# Patient Record
Sex: FEMALE | Race: WHITE | URBAN - METROPOLITAN AREA
[De-identification: names, ages, dates, MRNs, and addresses within clinical notes are randomized per-mention and may not be internally consistent; named-entity substitution may affect disease eponyms.]

---

## 2022-07-07 ENCOUNTER — INPATIENT (INPATIENT)
Facility: HOSPITAL | Age: 82
LOS: 3 days | Discharge: HOME | End: 2022-07-11
Attending: HOSPITALIST | Admitting: HOSPITALIST

## 2022-07-07 VITALS — WEIGHT: 119.93 LBS

## 2022-07-07 LAB
ALBUMIN SERPL ELPH-MCNC: 3.6 G/DL — SIGNIFICANT CHANGE UP (ref 3.5–5.2)
ALP SERPL-CCNC: 115 U/L — SIGNIFICANT CHANGE UP (ref 30–115)
ALT FLD-CCNC: 8 U/L — SIGNIFICANT CHANGE UP (ref 0–41)
ANION GAP SERPL CALC-SCNC: 11 MMOL/L — SIGNIFICANT CHANGE UP (ref 7–14)
APTT BLD: 28.7 SEC — SIGNIFICANT CHANGE UP (ref 27–39.2)
AST SERPL-CCNC: 15 U/L — SIGNIFICANT CHANGE UP (ref 0–41)
BASOPHILS # BLD AUTO: 0.04 K/UL — SIGNIFICANT CHANGE UP (ref 0–0.2)
BASOPHILS # BLD AUTO: 0.04 K/UL — SIGNIFICANT CHANGE UP (ref 0–0.2)
BASOPHILS NFR BLD AUTO: 0.6 % — SIGNIFICANT CHANGE UP (ref 0–1)
BASOPHILS NFR BLD AUTO: 0.6 % — SIGNIFICANT CHANGE UP (ref 0–1)
BILIRUB SERPL-MCNC: 0.4 MG/DL — SIGNIFICANT CHANGE UP (ref 0.2–1.2)
BLD GP AB SCN SERPL QL: SIGNIFICANT CHANGE UP
BUN SERPL-MCNC: 16 MG/DL — SIGNIFICANT CHANGE UP (ref 10–20)
CALCIUM SERPL-MCNC: 8.7 MG/DL — SIGNIFICANT CHANGE UP (ref 8.5–10.1)
CHLORIDE SERPL-SCNC: 107 MMOL/L — SIGNIFICANT CHANGE UP (ref 98–110)
CO2 SERPL-SCNC: 26 MMOL/L — SIGNIFICANT CHANGE UP (ref 17–32)
CREAT SERPL-MCNC: 0.7 MG/DL — SIGNIFICANT CHANGE UP (ref 0.7–1.5)
EGFR: 86 ML/MIN/1.73M2 — SIGNIFICANT CHANGE UP
EOSINOPHIL # BLD AUTO: 0.13 K/UL — SIGNIFICANT CHANGE UP (ref 0–0.7)
EOSINOPHIL # BLD AUTO: 0.17 K/UL — SIGNIFICANT CHANGE UP (ref 0–0.7)
EOSINOPHIL NFR BLD AUTO: 2 % — SIGNIFICANT CHANGE UP (ref 0–8)
EOSINOPHIL NFR BLD AUTO: 2.5 % — SIGNIFICANT CHANGE UP (ref 0–8)
GLUCOSE SERPL-MCNC: 112 MG/DL — HIGH (ref 70–99)
HCT VFR BLD CALC: 25.9 % — LOW (ref 37–47)
HCT VFR BLD CALC: 28.3 % — LOW (ref 37–47)
HGB BLD-MCNC: 7.5 G/DL — LOW (ref 12–16)
HGB BLD-MCNC: 8.2 G/DL — LOW (ref 12–16)
IMM GRANULOCYTES NFR BLD AUTO: 0.1 % — SIGNIFICANT CHANGE UP (ref 0.1–0.3)
IMM GRANULOCYTES NFR BLD AUTO: 0.3 % — SIGNIFICANT CHANGE UP (ref 0.1–0.3)
INR BLD: 1.08 RATIO — SIGNIFICANT CHANGE UP (ref 0.65–1.3)
LYMPHOCYTES # BLD AUTO: 1.6 K/UL — SIGNIFICANT CHANGE UP (ref 1.2–3.4)
LYMPHOCYTES # BLD AUTO: 1.98 K/UL — SIGNIFICANT CHANGE UP (ref 1.2–3.4)
LYMPHOCYTES # BLD AUTO: 24.7 % — SIGNIFICANT CHANGE UP (ref 20.5–51.1)
LYMPHOCYTES # BLD AUTO: 29.6 % — SIGNIFICANT CHANGE UP (ref 20.5–51.1)
MCHC RBC-ENTMCNC: 22.8 PG — LOW (ref 27–31)
MCHC RBC-ENTMCNC: 23.1 PG — LOW (ref 27–31)
MCHC RBC-ENTMCNC: 29 G/DL — LOW (ref 32–37)
MCHC RBC-ENTMCNC: 29 G/DL — LOW (ref 32–37)
MCV RBC AUTO: 78.7 FL — LOW (ref 81–99)
MCV RBC AUTO: 79.7 FL — LOW (ref 81–99)
MONOCYTES # BLD AUTO: 0.68 K/UL — HIGH (ref 0.1–0.6)
MONOCYTES # BLD AUTO: 0.71 K/UL — HIGH (ref 0.1–0.6)
MONOCYTES NFR BLD AUTO: 10.2 % — HIGH (ref 1.7–9.3)
MONOCYTES NFR BLD AUTO: 11 % — HIGH (ref 1.7–9.3)
NEUTROPHILS # BLD AUTO: 3.81 K/UL — SIGNIFICANT CHANGE UP (ref 1.4–6.5)
NEUTROPHILS # BLD AUTO: 3.98 K/UL — SIGNIFICANT CHANGE UP (ref 1.4–6.5)
NEUTROPHILS NFR BLD AUTO: 57 % — SIGNIFICANT CHANGE UP (ref 42.2–75.2)
NEUTROPHILS NFR BLD AUTO: 61.4 % — SIGNIFICANT CHANGE UP (ref 42.2–75.2)
NRBC # BLD: 0 /100 WBCS — SIGNIFICANT CHANGE UP (ref 0–0)
NRBC # BLD: 0 /100 WBCS — SIGNIFICANT CHANGE UP (ref 0–0)
PLATELET # BLD AUTO: 255 K/UL — SIGNIFICANT CHANGE UP (ref 130–400)
PLATELET # BLD AUTO: 285 K/UL — SIGNIFICANT CHANGE UP (ref 130–400)
POTASSIUM SERPL-MCNC: 4.5 MMOL/L — SIGNIFICANT CHANGE UP (ref 3.5–5)
POTASSIUM SERPL-SCNC: 4.5 MMOL/L — SIGNIFICANT CHANGE UP (ref 3.5–5)
PROT SERPL-MCNC: 6.4 G/DL — SIGNIFICANT CHANGE UP (ref 6–8)
PROTHROM AB SERPL-ACNC: 12.4 SEC — SIGNIFICANT CHANGE UP (ref 9.95–12.87)
RBC # BLD: 3.29 M/UL — LOW (ref 4.2–5.4)
RBC # BLD: 3.55 M/UL — LOW (ref 4.2–5.4)
RBC # FLD: 15.2 % — HIGH (ref 11.5–14.5)
RBC # FLD: 15.3 % — HIGH (ref 11.5–14.5)
SARS-COV-2 RNA SPEC QL NAA+PROBE: SIGNIFICANT CHANGE UP
SODIUM SERPL-SCNC: 144 MMOL/L — SIGNIFICANT CHANGE UP (ref 135–146)
WBC # BLD: 6.48 K/UL — SIGNIFICANT CHANGE UP (ref 4.8–10.8)
WBC # BLD: 6.69 K/UL — SIGNIFICANT CHANGE UP (ref 4.8–10.8)
WBC # FLD AUTO: 6.48 K/UL — SIGNIFICANT CHANGE UP (ref 4.8–10.8)
WBC # FLD AUTO: 6.69 K/UL — SIGNIFICANT CHANGE UP (ref 4.8–10.8)

## 2022-07-07 PROCEDURE — 93010 ELECTROCARDIOGRAM REPORT: CPT

## 2022-07-07 PROCEDURE — 99285 EMERGENCY DEPT VISIT HI MDM: CPT | Mod: FS

## 2022-07-07 PROCEDURE — 71045 X-RAY EXAM CHEST 1 VIEW: CPT | Mod: 26

## 2022-07-07 NOTE — ED ADULT TRIAGE NOTE - CHIEF COMPLAINT QUOTE
Pt here with low Hemoglobin. Decreased PO x 3 weeks as per EMS and caregiver on site denies blood in stool or urine . Pt with dementia alert to verbal stimuli

## 2022-07-07 NOTE — ED PROVIDER NOTE - OBJECTIVE STATEMENT
81-year-old female history of dementia sent to the ED from Dr. Renee's office for evaluation of low hemoglobin.  Patient outpatient labs reveal hemoglobin 7.2.  At baseline patient with dementia, A&O x1.  Unable to obtain comprehensive review of system from patient due to baseline mental status. As per aide no reported dark or bloody stools.

## 2022-07-07 NOTE — ED PROVIDER NOTE - CLINICAL SUMMARY MEDICAL DECISION MAKING FREE TEXT BOX
Patient presented with low Hb as outpatient, no hx anemia. AAOx1 and unable to provide further history. Otherwise on arrival patient afebrile, HD stable, ANGELA negative, no evidence of bleeding on exam or reported In history. Labs obtained and showed Hb 8.2 (unknown baseline) but otherwise unremarkable. Repeat CBC a few hours later showed Hb 7.5. Given drop in Hb and new onset anemia, will require admission for further work up and management. Hd stable at time of admission.

## 2022-07-07 NOTE — PATIENT PROFILE ADULT - FALL HARM RISK - HARM RISK INTERVENTIONS
Assistance with ambulation/Assistance OOB with selected safe patient handling equipment/Communicate Risk of Fall with Harm to all staff/Discuss with provider need for PT consult/Monitor gait and stability/Reinforce activity limits and safety measures with patient and family/Tailored Fall Risk Interventions/Visual Cue: Yellow wristband and red socks/Bed in lowest position, wheels locked, appropriate side rails in place/Call bell, personal items and telephone in reach/Instruct patient to call for assistance before getting out of bed or chair/Non-slip footwear when patient is out of bed/[place] to call system/Physically safe environment - no spills, clutter or unnecessary equipment/Purposeful Proactive Rounding/Room/bathroom lighting operational, light cord in reach

## 2022-07-07 NOTE — ED PROVIDER NOTE - PHYSICAL EXAMINATION
GENERAL: Well-nourished, Well-developed. NAD.  HEAD: No visible or palpable bumps or hematomas. No ecchymosis behind ears B/L.  Eyes: PERRLA, EOMI. No asymmetry. No nystagmus. No conjunctival injection. Non-icteric sclera.  ENMT: MMM.   Neck: Supple. FROM  CVS: Normal S1,S2. No murmurs appreciated on auscultation   RESP: No use of accessory muscles. Chest rise symmetrical with good expansion. Lungs clear to auscultation B/L. No wheezing, rales, or rhonchi auscultated.  GI: Normal auscultation of bowel sounds in all 4 quadrants. Soft, Nontender, Nondistended. No guarding or rebound tenderness. No CVAT B/L.  Skin: Warm, Dry. No rashes or lesions. Good cap refill < 2 sec B/L.  EXT: Radial and pedal pulses present B/L. No calf tenderness or swelling B/L. No palpable cords. No pedal edema B/L.  Neuro: AA&O x 1. Speaking in full sentences. No slurring of speech. No facial droop. No tremors. Sensation grossly intact. Strength 5/5 B/L. GENERAL: Well-nourished, Well-developed. NAD.  HEAD: No visible or palpable bumps or hematomas. No ecchymosis behind ears B/L.  Eyes: PERRLA, EOMI. No asymmetry. No nystagmus. No conjunctival injection. Non-icteric sclera.  ENMT: MMM.   Neck: Supple. FROM  CVS: Normal S1,S2. No murmurs appreciated on auscultation   RESP: No use of accessory muscles. Chest rise symmetrical with good expansion. Lungs clear to auscultation B/L. No wheezing, rales, or rhonchi auscultated.  GI: Normal auscultation of bowel sounds in all 4 quadrants. Soft, Nontender, Nondistended. No guarding or rebound tenderness. No CVAT B/L.  Skin: Warm, Dry. No rashes or lesions. Good cap refill < 2 sec B/L.  EXT: Radial and pedal pulses present B/L. No calf tenderness or swelling B/L. No palpable cords. No pedal edema B/L.  Neuro: AA&O x 1. Speaking in full sentences. No slurring of speech. No facial droop. No tremors. Sensation grossly intact. Strength 5/5 B/L.  rectal: no gross blood. chaperoned by fredis nair

## 2022-07-07 NOTE — ED PROVIDER NOTE - NS ED ATTENDING STATEMENT MOD
This was a shared visit with the CRISTINA. I reviewed and verified the documentation and independently performed the documented:

## 2022-07-08 LAB
ALBUMIN SERPL ELPH-MCNC: 3.3 G/DL — LOW (ref 3.5–5.2)
ALP SERPL-CCNC: 102 U/L — SIGNIFICANT CHANGE UP (ref 30–115)
ALT FLD-CCNC: 8 U/L — SIGNIFICANT CHANGE UP (ref 0–41)
ANION GAP SERPL CALC-SCNC: 6 MMOL/L — LOW (ref 7–14)
AST SERPL-CCNC: 15 U/L — SIGNIFICANT CHANGE UP (ref 0–41)
BASOPHILS # BLD AUTO: 0.03 K/UL — SIGNIFICANT CHANGE UP (ref 0–0.2)
BASOPHILS NFR BLD AUTO: 0.6 % — SIGNIFICANT CHANGE UP (ref 0–1)
BILIRUB SERPL-MCNC: 0.6 MG/DL — SIGNIFICANT CHANGE UP (ref 0.2–1.2)
BUN SERPL-MCNC: 10 MG/DL — SIGNIFICANT CHANGE UP (ref 10–20)
CALCIUM SERPL-MCNC: 8.6 MG/DL — SIGNIFICANT CHANGE UP (ref 8.5–10.1)
CHLORIDE SERPL-SCNC: 108 MMOL/L — SIGNIFICANT CHANGE UP (ref 98–110)
CO2 SERPL-SCNC: 27 MMOL/L — SIGNIFICANT CHANGE UP (ref 17–32)
CREAT SERPL-MCNC: 0.6 MG/DL — LOW (ref 0.7–1.5)
EGFR: 90 ML/MIN/1.73M2 — SIGNIFICANT CHANGE UP
EOSINOPHIL # BLD AUTO: 0.17 K/UL — SIGNIFICANT CHANGE UP (ref 0–0.7)
EOSINOPHIL NFR BLD AUTO: 3.6 % — SIGNIFICANT CHANGE UP (ref 0–8)
GLUCOSE SERPL-MCNC: 91 MG/DL — SIGNIFICANT CHANGE UP (ref 70–99)
HCT VFR BLD CALC: 25.5 % — LOW (ref 37–47)
HCT VFR BLD CALC: 26.6 % — LOW (ref 37–47)
HGB BLD-MCNC: 7.3 G/DL — LOW (ref 12–16)
HGB BLD-MCNC: 7.7 G/DL — LOW (ref 12–16)
IMM GRANULOCYTES NFR BLD AUTO: 0.2 % — SIGNIFICANT CHANGE UP (ref 0.1–0.3)
LYMPHOCYTES # BLD AUTO: 1.77 K/UL — SIGNIFICANT CHANGE UP (ref 1.2–3.4)
LYMPHOCYTES # BLD AUTO: 37.7 % — SIGNIFICANT CHANGE UP (ref 20.5–51.1)
MAGNESIUM SERPL-MCNC: 2.1 MG/DL — SIGNIFICANT CHANGE UP (ref 1.8–2.4)
MCHC RBC-ENTMCNC: 22.3 PG — LOW (ref 27–31)
MCHC RBC-ENTMCNC: 22.5 PG — LOW (ref 27–31)
MCHC RBC-ENTMCNC: 28.6 G/DL — LOW (ref 32–37)
MCHC RBC-ENTMCNC: 28.9 G/DL — LOW (ref 32–37)
MCV RBC AUTO: 77.8 FL — LOW (ref 81–99)
MCV RBC AUTO: 78 FL — LOW (ref 81–99)
MONOCYTES # BLD AUTO: 0.62 K/UL — HIGH (ref 0.1–0.6)
MONOCYTES NFR BLD AUTO: 13.2 % — HIGH (ref 1.7–9.3)
NEUTROPHILS # BLD AUTO: 2.09 K/UL — SIGNIFICANT CHANGE UP (ref 1.4–6.5)
NEUTROPHILS NFR BLD AUTO: 44.7 % — SIGNIFICANT CHANGE UP (ref 42.2–75.2)
NRBC # BLD: 0 /100 WBCS — SIGNIFICANT CHANGE UP (ref 0–0)
NRBC # BLD: 0 /100 WBCS — SIGNIFICANT CHANGE UP (ref 0–0)
PLATELET # BLD AUTO: 220 K/UL — SIGNIFICANT CHANGE UP (ref 130–400)
PLATELET # BLD AUTO: 245 K/UL — SIGNIFICANT CHANGE UP (ref 130–400)
POTASSIUM SERPL-MCNC: 4.3 MMOL/L — SIGNIFICANT CHANGE UP (ref 3.5–5)
POTASSIUM SERPL-SCNC: 4.3 MMOL/L — SIGNIFICANT CHANGE UP (ref 3.5–5)
PROT SERPL-MCNC: 5.6 G/DL — LOW (ref 6–8)
RBC # BLD: 3.27 M/UL — LOW (ref 4.2–5.4)
RBC # BLD: 3.42 M/UL — LOW (ref 4.2–5.4)
RBC # FLD: 15.1 % — HIGH (ref 11.5–14.5)
RBC # FLD: 15.1 % — HIGH (ref 11.5–14.5)
SODIUM SERPL-SCNC: 141 MMOL/L — SIGNIFICANT CHANGE UP (ref 135–146)
TSH SERPL-MCNC: 5.33 UIU/ML — HIGH (ref 0.27–4.2)
WBC # BLD: 4.69 K/UL — LOW (ref 4.8–10.8)
WBC # BLD: 5.23 K/UL — SIGNIFICANT CHANGE UP (ref 4.8–10.8)
WBC # FLD AUTO: 4.69 K/UL — LOW (ref 4.8–10.8)
WBC # FLD AUTO: 5.23 K/UL — SIGNIFICANT CHANGE UP (ref 4.8–10.8)

## 2022-07-08 PROCEDURE — 99223 1ST HOSP IP/OBS HIGH 75: CPT

## 2022-07-08 RX ORDER — PANTOPRAZOLE SODIUM 20 MG/1
40 TABLET, DELAYED RELEASE ORAL
Refills: 0 | Status: DISCONTINUED | OUTPATIENT
Start: 2022-07-08 | End: 2022-07-09

## 2022-07-08 RX ADMIN — PANTOPRAZOLE SODIUM 40 MILLIGRAM(S): 20 TABLET, DELAYED RELEASE ORAL at 20:06

## 2022-07-08 RX ADMIN — PANTOPRAZOLE SODIUM 40 MILLIGRAM(S): 20 TABLET, DELAYED RELEASE ORAL at 05:12

## 2022-07-08 NOTE — H&P ADULT - NSHPPHYSICALEXAM_GEN_ALL_CORE
PHYSICAL EXAM:  GENERAL: NAD, lying in bed comfortably  HEAD:  Atraumatic, Normocephalic  EYES: EOMI, PERRLA, conjunctiva and sclera clear  ENT: Moist mucous membranes  NECK: Supple, No JVD  CHEST/LUNG: Clear to auscultation bilaterally  HEART: Regular rate and rhythm  ABDOMEN: Bowel sounds present; Soft, Nontender, Nondistended  EXTREMITIES:  2+ Peripheral Pulses, brisk capillary refill  NERVOUS SYSTEM:  Alert & Oriented X1  MSK: FROM all 4 extremities  SKIN: No rashes or lesions

## 2022-07-08 NOTE — CONSULT NOTE ADULT - ASSESSMENT
82 y/o F w/ Dementia sent to the ED from Dr. Renee's office for evaluation of low hemoglobin. Was at Dr. Renee' office who did blood work and noted her Hg was 7.2 which was 3 units lower than her prior lab work. GI consulted for anemia.     #Anemia, likely DIMITRIOS  - HD stable, asymptomatic  - Hgb 7.3, MCV 78      Recs:  - obtain iron profile, TSH, B12, folate  - keep active T&S  - monitor H/H, keep Hgb >7  -  82 y/o F w/ Dementia sent to the ED from Dr. Renee's office for evaluation of low hemoglobin. Was at Dr. Renee' office who did blood work and noted her Hg was 7.2 which was 3 units lower than her prior lab work. GI consulted for anemia.     #Anemia, likely DIMITRIOS  - HD stable, asymptomatic  - Hgb 7.3, MCV 78  - ANGELA brown stool, no blood    Recs:  - obtain iron profile, TSH, B12, folate  - keep active T&S  - monitor H/H, keep Hgb >7  -  82 y/o F w/ Dementia sent to the ED from Dr. Renee's office for evaluation of low hemoglobin. Was at Dr. Renee' office who did blood work and noted her Hg was 7.2 which was 3 units lower than her prior lab work. GI consulted for anemia.     #Anemia, likely DIMITRIOS  - HD stable, asymptomatic  - Hgb 7.3, MCV 78  - no active signs of bleed  - ANGELA brown stool, no blood    Recs:  - obtain iron profile, TSH, B12, folate  - keep active T&S  - 2 18 gauge IV  - monitor H/H, keep Hgb >7  - pls notify GI if patient has active signs of GI bleed 82 y/o F w/ Dementia sent to the ED from Dr. Renee's office for evaluation of low hemoglobin. Was at Dr. Renee' office who did blood work and noted her Hg was 7.2 which was 3 units lower than her prior lab work. GI consulted for anemia.     #Anemia, likely 2/2 DIMITRIOS  - HD stable, asymptomatic  - Hgb 7.3, MCV 78  - no active signs of bleed  - ANGELA brown stool, no blood    Recs:  - obtain iron profile, TSH, B12, folate  - keep active T&S  - 2 18 gauge IV  - monitor H/H, keep Hgb >7  - please notify GI if patient has active signs of GI bleed  - patient will need GI workup with EGD/colonoscopy, capsule endoscopy. Can be done outpatient

## 2022-07-08 NOTE — H&P ADULT - ASSESSMENT
82 y/o F w/ Dementia sent to the ED from Dr. Renee's office for evaluation of low hemoglobin.  Patient outpatient labs reveal hemoglobin 7.2.  At baseline patient with dementia, A&O x1.  Unable to obtain comprehensive review of system from patient due to baseline mental status. As per aide no reported dark or bloody stools.    #Acute    In ED, VS wnl, Labs sig for Hg of 8.2, repeat 7.5, ANGELA negative as per ED. Admitted to Medicine   82 y/o F w/ Dementia sent to the ED from Dr. Renee's office for evaluation of low hemoglobin    #Acute Microcytic Anemia  - In ED, VS wnl, not tachycardic or hypotensive  - Labs sig for Hg of 8.2, repeat 7.5 (was apparently 3 Units higher of recent OP labs)  - ANGELA negative as per ED  - Send Iron Panel, TSH, B12, Folate  - Start on Protonix 40 mg BID  - Trend Hg, Active T&S, 2 Large Bore IV's, Transfuse as needed  - Obtain GI Consult  - Clear Liquid Diet for now    #Dementia - c/w Home Meds    #Diet: CLD  #DVT pro: SCD  #GI pro: Protonix  #Dispo: Medicine     82 y/o F w/ Dementia sent to the ED from Dr. Renee's office for evaluation of low hemoglobin    #Acute Microcytic Anemia  - In ED, VS wnl, not tachycardic or hypotensive  - Labs sig for Hg of 8.2, repeat 7.5 (was apparently 3 Units higher of recent OP labs)  - ANGELA negative as per ED  - Send Iron Panel, TSH, B12, Folate  - Start on Protonix 40 mg BID  - Trend Hg, Active T&S, 2 Large Bore IV's, Transfuse as needed  - Obtain GI Consult  - Clear Liquid Diet for now    #Dementia - c/w Home Meds once med recc completed    #Diet: CLD  #DVT pro: SCD  #GI pro: Protonix  #Dispo: Medicine    ***No Meds in charts, attempted to call contact, number not in service, no meds on surescripts, no pharmacy on file. please confirm meds w/ Aid in AM if possible***     82 y/o F w/ Dementia sent to the ED from Dr. Renee's office for evaluation of low hemoglobin    #Acute Microcytic Anemia  - In ED, VS wnl, not tachycardic or hypotensive  - Labs sig for Hg of 8.2, repeat 7.5 (was apparently 3 Units higher of recent OP labs)  - ANGELA negative as per ED  - Send Iron Panel, TSH, B12, Folate  - Start on Protonix 40 mg BID  - Trend Hg, Active T&S, 2 Large Bore IV's, Transfuse as needed  - Obtain GI Consult  - Clear Liquid Diet for now.     #Dementia - c/w Home Meds once med recc completed    #Diet: CLD  #DVT pro: SCD  #GI pro: Protonix  #Dispo: Medicine

## 2022-07-08 NOTE — CONSULT NOTE ADULT - SUBJECTIVE AND OBJECTIVE BOX
Gastroenterology Consultation: anemia    Patient is a 81y old  Female who presents with a chief complaint of anemia.      Admitted on: 07-07-22      HPI:  80 y/o F w/ Dementia sent to the ED from Dr. Renee's office for evaluation of low hemoglobin. At baseline patient with Dementia, A&O x1.  Unable to obtain history from patient due to mental status, contact number in charts not in service. As per aide no reported dark or bloody stools. Was at Dr. Renee' office who did blood work and noted her Hg was 7.2 which was 3 units lower than her prior lab work     In ED, VS wnl, Labs sig for Hg of 8.2, repeat 7.5, ANGELA negative as per ED. Admitted to Medicine   (08 Jul 2022 00:33)    Interval History:  Patient was interviewed and examined at bedside. Patient awake and alert, but uncooperative with interview and questions. Patient unable to answer any questions or provide any further details/history.     Prior EGD: N/A    Prior Colonoscopy: N/A      PAST MEDICAL & SURGICAL HISTORY:        FAMILY HISTORY:      Social History:  Tobacco: n  Alcohol: n  Drugs: n    Home Medications:  Namzaric 10 mg-7 mg oral capsule, extended release: 1 cap(s) orally once a day (07 Jul 2022 18:10)        MEDICATIONS  (STANDING):  pantoprazole  Injectable 40 milliGRAM(s) IV Push two times a day    MEDICATIONS  (PRN):      Allergies  No Known Allergies      Review of Systems:   Unable to obtain         Physical Examination:  T(C): 36.7 (07-08-22 @ 05:00), Max: 37.1 (07-07-22 @ 17:42)  HR: 72 (07-08-22 @ 05:00) (72 - 96)  BP: 138/63 (07-08-22 @ 05:00) (138/63 - 178/86)  RR: 18 (07-08-22 @ 05:00) (18 - 19)  SpO2: 96% (07-08-22 @ 05:00) (95% - 97%)  Height (cm): 162.6 (07-07-22 @ 22:56)  Weight (kg): 68.5 (07-07-22 @ 22:56)        GENERAL: AAOx1, no acute distress.  HEAD:  Atraumatic, Normocephalic  EYES: pale conjunctiva, sclera clear  NECK: Supple, no JVD or thyromegaly  CHEST/LUNG: Clear to auscultation bilaterally; No wheeze, rhonchi, or rales  HEART: Regular rate and rhythm; normal S1, S2, No murmurs.  ABDOMEN: Soft, nontender, nondistended; Bowel sounds present  NEUROLOGY: No asterixis or tremor.   SKIN: Intact, no jaundice        Data:                        7.3    4.69  )-----------( 220      ( 08 Jul 2022 08:49 )             25.5     Hgb Trend:  7.3  07-08-22 @ 08:49  7.5  07-07-22 @ 20:36  8.2  07-07-22 @ 16:44      07-08    141  |  108  |  10  ----------------------------<  91  4.3   |  27  |  0.6<L>    Ca    8.6      08 Jul 2022 08:49  Mg     2.1     07-08    TPro  5.6<L>  /  Alb  3.3<L>  /  TBili  0.6  /  DBili  x   /  AST  15  /  ALT  8   /  AlkPhos  102  07-08    Liver panel trend:  TBili 0.6   /   AST 15   /   ALT 8   /   AlkP 102   /   Tptn 5.6   /   Alb 3.3    /   DBili --      07-08  TBili 0.4   /   AST 15   /   ALT 8   /   AlkP 115   /   Tptn 6.4   /   Alb 3.6    /   DBili --      07-07      PT/INR - ( 07 Jul 2022 16:44 )   PT: 12.40 sec;   INR: 1.08 ratio         PTT - ( 07 Jul 2022 16:44 )  PTT:28.7 sec        Radiology:    Xray Chest 1 View- PORTABLE-Urgent 07.07.22 @ 19:03      Findings:    Support devices: None.    Cardiac/mediastinum/hilum: The aorta is atherosclerotic.    Lung parenchyma/Pleura: Within normal limits.    Skeleton/soft tissues: Unremarkable.    Impression:    No radiographic evidence of acute cardiopulmonary disease.    Low lung volumes leads to magnification of the pulmonary interstitium.    --- End of Report ---            HE BUTLER MD; Attending Interventional Radiologist  This document has been electronically signed. Jul 8 2022  7:29AM

## 2022-07-08 NOTE — H&P ADULT - HISTORY OF PRESENT ILLNESS
80 y/o F w/ Dementia sent to the ED from Dr. Renee's office for evaluation of low hemoglobin.  Patient outpatient labs reveal hemoglobin 7.2.  At baseline patient with dementia, A&O x1.  Unable to obtain comprehensive review of system from patient due to baseline mental status. As per aide no reported dark or bloody stools.     In ED, VS wnl, Labs sig for Hg of 8.2, repeat 7.5, ANGELA negative as per ED. Admitted to Medicine   80 y/o F w/ Dementia sent to the ED from Dr. Renee's office for evaluation of low hemoglobin. At baseline patient with dementia, A&O x1.  Unable to obtain history from patient due to mental status, contact number in charts not in service. As per aide no reported dark or bloody stools. Was at Dr. Renee' office who did blood work and noted her Hg was 7.2 which was 3 units lower than her prior lab work     In ED, VS wnl, Labs sig for Hg of 8.2, repeat 7.5, ANGELA negative as per ED. Admitted to Medicine   82 y/o F w/ Dementia sent to the ED from Dr. Renee's office for evaluation of low hemoglobin. At baseline patient with Dementia, A&O x1.  Unable to obtain history from patient due to mental status, contact number in charts not in service. As per aide no reported dark or bloody stools. Was at Dr. Renee' office who did blood work and noted her Hg was 7.2 which was 3 units lower than her prior lab work     In ED, VS wnl, Labs sig for Hg of 8.2, repeat 7.5, ANGELA negative as per ED. Admitted to Medicine

## 2022-07-08 NOTE — PROGRESS NOTE ADULT - ASSESSMENT
82 y/o F w/ Dementia sent to the ED from Dr. Renee's office for evaluation of low hemoglobin    #Acute Microcytic Anemia  - In ED, VS wnl, not tachycardic or hypotensive  - Labs sig for Hg of 7.3 (down from 8.2 and 7.5 on 7/7)  - Send Iron Panel, TSH, B12, Folate  - Start on Protonix 40 mg BID  - Trend Hg, Active T&S, 2 Large Bore IV's, Transfuse as needed  - Obtain GI Consult  - Clear Liquid Diet for now    #Dementia - c/w Home Meds once med recc completed    #Diet: CLD  #DVT pro: SCD  #GI pro: Protonix  #Dispo: Medicine

## 2022-07-08 NOTE — H&P ADULT - NSHPLABSRESULTS_GEN_ALL_CORE
7.5    6.48  )-----------( 255      ( 07 Jul 2022 20:36 )             25.9       07-07    144  |  107  |  16  ----------------------------<  112<H>  4.5   |  26  |  0.7    Ca    8.7      07 Jul 2022 16:44    TPro  6.4  /  Alb  3.6  /  TBili  0.4  /  DBili  x   /  AST  15  /  ALT  8   /  AlkPhos  115  07-07                  PT/INR - ( 07 Jul 2022 16:44 )   PT: 12.40 sec;   INR: 1.08 ratio         PTT - ( 07 Jul 2022 16:44 )  PTT:28.7 sec          CAPILLARY BLOOD GLUCOSE

## 2022-07-09 LAB
ALBUMIN SERPL ELPH-MCNC: 3.8 G/DL — SIGNIFICANT CHANGE UP (ref 3.5–5.2)
ALP SERPL-CCNC: 111 U/L — SIGNIFICANT CHANGE UP (ref 30–115)
ALT FLD-CCNC: 11 U/L — SIGNIFICANT CHANGE UP (ref 0–41)
ANION GAP SERPL CALC-SCNC: 11 MMOL/L — SIGNIFICANT CHANGE UP (ref 7–14)
AST SERPL-CCNC: 20 U/L — SIGNIFICANT CHANGE UP (ref 0–41)
BILIRUB SERPL-MCNC: 0.8 MG/DL — SIGNIFICANT CHANGE UP (ref 0.2–1.2)
BUN SERPL-MCNC: 9 MG/DL — LOW (ref 10–20)
CALCIUM SERPL-MCNC: 9 MG/DL — SIGNIFICANT CHANGE UP (ref 8.5–10.1)
CHLORIDE SERPL-SCNC: 103 MMOL/L — SIGNIFICANT CHANGE UP (ref 98–110)
CO2 SERPL-SCNC: 26 MMOL/L — SIGNIFICANT CHANGE UP (ref 17–32)
CREAT SERPL-MCNC: 0.7 MG/DL — SIGNIFICANT CHANGE UP (ref 0.7–1.5)
EGFR: 86 ML/MIN/1.73M2 — SIGNIFICANT CHANGE UP
FERRITIN SERPL-MCNC: 5 NG/ML — LOW (ref 15–150)
FOLATE SERPL-MCNC: 14.2 NG/ML — SIGNIFICANT CHANGE UP
GLUCOSE SERPL-MCNC: 90 MG/DL — SIGNIFICANT CHANGE UP (ref 70–99)
HCT VFR BLD CALC: 29.2 % — LOW (ref 37–47)
HGB BLD-MCNC: 8.3 G/DL — LOW (ref 12–16)
IRON SATN MFR SERPL: 13 UG/DL — LOW (ref 35–150)
MAGNESIUM SERPL-MCNC: 2.1 MG/DL — SIGNIFICANT CHANGE UP (ref 1.8–2.4)
MCHC RBC-ENTMCNC: 22.1 PG — LOW (ref 27–31)
MCHC RBC-ENTMCNC: 28.4 G/DL — LOW (ref 32–37)
MCV RBC AUTO: 77.9 FL — LOW (ref 81–99)
NRBC # BLD: 0 /100 WBCS — SIGNIFICANT CHANGE UP (ref 0–0)
PLATELET # BLD AUTO: 252 K/UL — SIGNIFICANT CHANGE UP (ref 130–400)
POTASSIUM SERPL-MCNC: 4.8 MMOL/L — SIGNIFICANT CHANGE UP (ref 3.5–5)
POTASSIUM SERPL-SCNC: 4.8 MMOL/L — SIGNIFICANT CHANGE UP (ref 3.5–5)
PROT SERPL-MCNC: 6.4 G/DL — SIGNIFICANT CHANGE UP (ref 6–8)
RBC # BLD: 3.75 M/UL — LOW (ref 4.2–5.4)
RBC # FLD: 15.2 % — HIGH (ref 11.5–14.5)
SODIUM SERPL-SCNC: 140 MMOL/L — SIGNIFICANT CHANGE UP (ref 135–146)
TSH SERPL-MCNC: 6.31 UIU/ML — HIGH (ref 0.27–4.2)
VIT B12 SERPL-MCNC: 686 PG/ML — SIGNIFICANT CHANGE UP (ref 232–1245)
WBC # BLD: 4.27 K/UL — LOW (ref 4.8–10.8)
WBC # FLD AUTO: 4.27 K/UL — LOW (ref 4.8–10.8)

## 2022-07-09 PROCEDURE — 99233 SBSQ HOSP IP/OBS HIGH 50: CPT

## 2022-07-09 RX ORDER — PANTOPRAZOLE SODIUM 20 MG/1
40 TABLET, DELAYED RELEASE ORAL
Refills: 0 | Status: DISCONTINUED | OUTPATIENT
Start: 2022-07-09 | End: 2022-07-11

## 2022-07-09 RX ORDER — FERROUS SULFATE 325(65) MG
325 TABLET ORAL DAILY
Refills: 0 | Status: DISCONTINUED | OUTPATIENT
Start: 2022-07-09 | End: 2022-07-11

## 2022-07-09 RX ADMIN — PANTOPRAZOLE SODIUM 40 MILLIGRAM(S): 20 TABLET, DELAYED RELEASE ORAL at 06:04

## 2022-07-09 NOTE — PROGRESS NOTE ADULT - ASSESSMENT
82 y/o F w/ Dementia sent to the ED from Dr. Renee's office for evaluation of low hemoglobin    #Acute on ? Chronic  Microcytic Anemia    Due to Severe Iron Def Anemia, Unclear Etiology.   - In ED, VS wnl, not tachycardic or hypotensive  -  Hb of 7.3 >> 8.3 today.  (down from 8.2 and 7.5 on 7/7)  -  Iron 13 ,  Ferritin 5    TSH 6.3  B12, Folate >> WNL  - Started  on Protonix 40 mg BID  - Trend Hg, Active T&S, 2 Large Bore IV's, Transfuse as needed     Will consider  venofer.    -  GI input noted. Scope as an outpt unless active bleed.   - Clear Liquid Diet for now > Will advance as tolerated.     #Dementia - c/w Home Meds once med recc completed    #Diet: CLD  #DVT pro: SCD  #GI pro: Protonix    #Progress Note Handoff  Pending (specify):  Clinical improvement /PT  Family discussion:  Disposition: Assisted Living Vs STR

## 2022-07-10 LAB
ALBUMIN SERPL ELPH-MCNC: 3.5 G/DL — SIGNIFICANT CHANGE UP (ref 3.5–5.2)
ALP SERPL-CCNC: 106 U/L — SIGNIFICANT CHANGE UP (ref 30–115)
ALT FLD-CCNC: 10 U/L — SIGNIFICANT CHANGE UP (ref 0–41)
ANION GAP SERPL CALC-SCNC: 9 MMOL/L — SIGNIFICANT CHANGE UP (ref 7–14)
AST SERPL-CCNC: 18 U/L — SIGNIFICANT CHANGE UP (ref 0–41)
BILIRUB SERPL-MCNC: 0.7 MG/DL — SIGNIFICANT CHANGE UP (ref 0.2–1.2)
BUN SERPL-MCNC: 8 MG/DL — LOW (ref 10–20)
CALCIUM SERPL-MCNC: 9 MG/DL — SIGNIFICANT CHANGE UP (ref 8.5–10.1)
CHLORIDE SERPL-SCNC: 106 MMOL/L — SIGNIFICANT CHANGE UP (ref 98–110)
CO2 SERPL-SCNC: 26 MMOL/L — SIGNIFICANT CHANGE UP (ref 17–32)
CREAT SERPL-MCNC: 0.8 MG/DL — SIGNIFICANT CHANGE UP (ref 0.7–1.5)
EGFR: 74 ML/MIN/1.73M2 — SIGNIFICANT CHANGE UP
FOLATE SERPL-MCNC: 18.9 NG/ML — SIGNIFICANT CHANGE UP
GLUCOSE SERPL-MCNC: 103 MG/DL — HIGH (ref 70–99)
HCT VFR BLD CALC: 27.8 % — LOW (ref 37–47)
HGB BLD-MCNC: 8.1 G/DL — LOW (ref 12–16)
MAGNESIUM SERPL-MCNC: 2.1 MG/DL — SIGNIFICANT CHANGE UP (ref 1.8–2.4)
MCHC RBC-ENTMCNC: 22.6 PG — LOW (ref 27–31)
MCHC RBC-ENTMCNC: 29.1 G/DL — LOW (ref 32–37)
MCV RBC AUTO: 77.7 FL — LOW (ref 81–99)
NRBC # BLD: 0 /100 WBCS — SIGNIFICANT CHANGE UP (ref 0–0)
PLATELET # BLD AUTO: 228 K/UL — SIGNIFICANT CHANGE UP (ref 130–400)
POTASSIUM SERPL-MCNC: 4.8 MMOL/L — SIGNIFICANT CHANGE UP (ref 3.5–5)
POTASSIUM SERPL-SCNC: 4.8 MMOL/L — SIGNIFICANT CHANGE UP (ref 3.5–5)
PROT SERPL-MCNC: 6 G/DL — SIGNIFICANT CHANGE UP (ref 6–8)
RBC # BLD: 3.58 M/UL — LOW (ref 4.2–5.4)
RBC # FLD: 15 % — HIGH (ref 11.5–14.5)
SODIUM SERPL-SCNC: 141 MMOL/L — SIGNIFICANT CHANGE UP (ref 135–146)
VIT B12 SERPL-MCNC: 923 PG/ML — SIGNIFICANT CHANGE UP (ref 232–1245)
WBC # BLD: 5.21 K/UL — SIGNIFICANT CHANGE UP (ref 4.8–10.8)
WBC # FLD AUTO: 5.21 K/UL — SIGNIFICANT CHANGE UP (ref 4.8–10.8)

## 2022-07-10 PROCEDURE — 99233 SBSQ HOSP IP/OBS HIGH 50: CPT

## 2022-07-10 RX ORDER — IRON SUCROSE 20 MG/ML
200 INJECTION, SOLUTION INTRAVENOUS EVERY 24 HOURS
Refills: 0 | Status: DISCONTINUED | OUTPATIENT
Start: 2022-07-10 | End: 2022-07-11

## 2022-07-10 RX ORDER — IRON SUCROSE 20 MG/ML
200 INJECTION, SOLUTION INTRAVENOUS EVERY 24 HOURS
Refills: 0 | Status: DISCONTINUED | OUTPATIENT
Start: 2022-07-10 | End: 2022-07-10

## 2022-07-10 RX ADMIN — PANTOPRAZOLE SODIUM 40 MILLIGRAM(S): 20 TABLET, DELAYED RELEASE ORAL at 06:05

## 2022-07-10 RX ADMIN — IRON SUCROSE 110 MILLIGRAM(S): 20 INJECTION, SOLUTION INTRAVENOUS at 15:58

## 2022-07-10 RX ADMIN — Medication 325 MILLIGRAM(S): at 12:50

## 2022-07-10 NOTE — PROGRESS NOTE ADULT - ASSESSMENT
80 y/o F w/ Dementia sent to the ED from Dr. Renee's office for evaluation of low hemoglobin    #Microcytic Anemia 2/2 DIMITRIOS   - No acute blood loss while in the hospital  - Due to Severe Iron Def Anemia, Unclear Etiology  - In ED, VS wnl, not tachycardic or hypotensive  -  Hb of 7.3 >> 8.3 today.  (down from 8.2 and 7.5 on 7/7)  -  Iron 13,  Ferritin 5  -  TSH 6.3  B12, Folate >> WNL  -  Started  on Protonix 40 mg BID  -  Trend Hg, Active T&S, 2 Large Bore IV's, Transfuse as needed  -  GI f/u in clinic for s-scope / egd and to complete w/up   -  Advance diet as tolerated.   -  IV Iron 200mg IV qd while in the hospital     #Dementia - c/w Home Meds once med recc completed    #Diet: CLD  #DVT pro: SCD  #GI pro: Protonix    #Progress Note Handoff  Pending (specify):  PT eval   Family discussion: per previous team   Disposition: Assisted Living vs STR / anticipated  and stable for d/c

## 2022-07-10 NOTE — PROGRESS NOTE ADULT - ASSESSMENT
80 y/o F w/ Dementia sent to the ED from Dr. Renee's office for evaluation of low hemoglobin    #Acute on ? Chronic  Microcytic Anemia    Due to Severe Iron Def Anemia, Unclear Etiology.   - In ED, VS wnl, not tachycardic or hypotensive  -  Hb of 7.3 >> 8.3 today.  (down from 8.2 and 7.5 on 7/7)  -  Iron 13 ,  Ferritin 5    TSH 6.3  B12, Folate >> WNL  - Started  on Protonix 40 mg BID  - Trend Hg, Active T&S, 2 Large Bore IV's, Transfuse as needed     Will consider  venofer.    -  GI input noted. Scope as an outpt unless active bleed.   - Clear Liquid Diet for now > Will advance as tolerated.     #Dementia - c/w Home Meds once med recc completed    #Diet: CLD  #DVT pro: SCD  #GI pro: Protonix    #Progress Note Handoff  Pending (specify):  Clinical improvement /PT  Family discussion:  Disposition: Assisted Living Vs STR

## 2022-07-11 ENCOUNTER — TRANSCRIPTION ENCOUNTER (OUTPATIENT)
Age: 82
End: 2022-07-11

## 2022-07-11 VITALS
HEART RATE: 98 BPM | OXYGEN SATURATION: 97 % | SYSTOLIC BLOOD PRESSURE: 140 MMHG | DIASTOLIC BLOOD PRESSURE: 56 MMHG | TEMPERATURE: 98 F | RESPIRATION RATE: 20 BRPM

## 2022-07-11 PROCEDURE — 99239 HOSP IP/OBS DSCHRG MGMT >30: CPT

## 2022-07-11 RX ORDER — SIMVASTATIN 20 MG/1
1 TABLET, FILM COATED ORAL
Qty: 0 | Refills: 0 | DISCHARGE

## 2022-07-11 RX ORDER — POLYETHYLENE GLYCOL 3350 17 G/17G
17 POWDER, FOR SOLUTION ORAL
Qty: 510 | Refills: 0
Start: 2022-07-11 | End: 2022-08-09

## 2022-07-11 RX ORDER — LEVOTHYROXINE SODIUM 125 MCG
12.5 TABLET ORAL
Qty: 0 | Refills: 0 | DISCHARGE

## 2022-07-11 RX ORDER — SENNA PLUS 8.6 MG/1
1 TABLET ORAL
Qty: 30 | Refills: 0
Start: 2022-07-11 | End: 2022-08-09

## 2022-07-11 RX ORDER — PANTOPRAZOLE SODIUM 20 MG/1
1 TABLET, DELAYED RELEASE ORAL
Qty: 0 | Refills: 0 | DISCHARGE

## 2022-07-11 RX ORDER — FERROUS SULFATE 325(65) MG
1 TABLET ORAL
Qty: 30 | Refills: 0
Start: 2022-07-11 | End: 2022-08-09

## 2022-07-11 RX ORDER — MEMANTINE HYDROCHLORIDE AND DONEPEZIL HYDROCHLORIDE 21; 10 MG/1; MG/1
1 CAPSULE ORAL
Qty: 30 | Refills: 0
Start: 2022-07-11 | End: 2022-08-09

## 2022-07-11 RX ORDER — MEMANTINE HYDROCHLORIDE AND DONEPEZIL HYDROCHLORIDE 21; 10 MG/1; MG/1
1 CAPSULE ORAL
Qty: 0 | Refills: 0 | DISCHARGE

## 2022-07-11 RX ORDER — QUETIAPINE FUMARATE 200 MG/1
15 TABLET, FILM COATED ORAL
Qty: 0 | Refills: 0 | DISCHARGE

## 2022-07-11 RX ADMIN — PANTOPRAZOLE SODIUM 40 MILLIGRAM(S): 20 TABLET, DELAYED RELEASE ORAL at 06:18

## 2022-07-11 RX ADMIN — Medication 325 MILLIGRAM(S): at 12:03

## 2022-07-11 NOTE — DISCHARGE NOTE PROVIDER - ATTENDING DISCHARGE PHYSICAL EXAMINATION:
Vital Signs Last 24 Hrs  T(C): 36.4 (11 Jul 2022 07:31), Max: 36.4 (11 Jul 2022 07:31)  T(F): 97.5 (11 Jul 2022 07:31), Max: 97.5 (11 Jul 2022 07:31)  HR: 98 (11 Jul 2022 07:31) (98 - 102)  BP: 140/56 (11 Jul 2022 07:31) (140/56 - 148/73)  BP(mean): --  RR: 20 (11 Jul 2022 07:31) (18 - 20)  SpO2: 97% (11 Jul 2022 07:31) (97% - 97%)    Parameters below as of 11 Jul 2022 07:31  Patient On (Oxygen Delivery Method): room air    GEN: LACK OF ATTENTION / DEMENTIA  CV: NL S1/2  RESP: CTA B/L   GI: +BS SOFT, NT ND  EXT: NO E/C/C   MS: AOX1                          8.1    5.21  )-----------( 228      ( 10 Jul 2022 05:33 )             27.8   07-10    141  |  106  |  8<L>  ----------------------------<  103<H>  4.8   |  26  |  0.8    Ca    9.0      10 Jul 2022 05:33    Mg     2.1     07-10    TPro  6.0  /  Alb  3.5  /  TBili  0.7  /  DBili  x   /  AST  18  /  ALT  10  /  AlkPhos  106  07-10

## 2022-07-11 NOTE — PHYSICAL THERAPY INITIAL EVALUATION ADULT - PERTINENT HX OF CURRENT PROBLEM, REHAB EVAL
82 y/o F w/ Dementia sent to the ED from Dr. Renee's office for evaluation of low hemoglobin. At baseline patient with Dementia, A&O x1.  Unable to obtain history from patient due to mental status, contact number in charts not in service. As per aide no reported dark or bloody stools. Was at Dr. Renee' office who did blood work and noted her Hg was 7.2 which was 3 units lower than her prior lab work.

## 2022-07-11 NOTE — DISCHARGE NOTE PROVIDER - NSDCMRMEDTOKEN_GEN_ALL_CORE_FT
ferrous sulfate 325 mg (65 mg elemental iron) oral tablet: 1 tab(s) orally once a day  levothyroxine: 12.5 microgram(s) orally once a day  Namzaric 10 mg-7 mg oral capsule, extended release: 1 cap(s) orally once a day  polyethylene glycol 3350 oral powder for reconstitution: 17 gram(s) orally once a day   Protonix 40 mg oral delayed release tablet: 1 tab(s) orally once a day  QUEtiapine: 15 milligram(s) orally once a day (at bedtime)  Senna 8.6 mg oral tablet: 1 tab(s) orally once a day (at bedtime)   simvastatin 10 mg oral tablet: 1 tab(s) orally once a day (at bedtime)

## 2022-07-11 NOTE — DISCHARGE NOTE NURSING/CASE MANAGEMENT/SOCIAL WORK - PATIENT PORTAL LINK FT
You can access the FollowMyHealth Patient Portal offered by [facility] by registering at the following website: [email or web address]. By joining [facility] FollowMyHealth portal, you will also be able to view your health information using other applications (apps) compatible with our system.

## 2022-07-11 NOTE — PHYSICAL THERAPY INITIAL EVALUATION ADULT - LIVES WITH, PROFILE
pt is confused, poor historian. pt is confused, poor historian. As per chart review pt a resident of [facility].

## 2022-07-11 NOTE — DISCHARGE NOTE PROVIDER - HOSPITAL COURSE
80 y/o F w/ Dementia sent to the ED from Dr. Renee's office for evaluation of low hemoglobin. Was at Dr. Renee' office who did blood work and noted her Hg was 7.2 which was 3 units lower than her prior lab work. GI consulted for anemia.     #Microcytic Anemia 2/2 DIMITRIOS   - No acute blood loss while in the hospital  - In ED, VS wnl, not tachycardic or hypotensive  - Hb of 7.3 >> 8.3 today.  (down from 8.2 and 7.5 on 7/7)  - Iron 13,  Ferritin 5  - TSH 6.3  B12, Folate >> WNL  - Started  on Protonix 40 mg BID  - s/p IV Iron 200mg IV qd while in the hospital, will be discharged for PO ferrous sulfate   -  currently HD stable, asymptomatic  - Hgb 7.3, MCV 78  - no active signs of bleed  - ANGELA brown stool, no blood  - patient will need GI workup with EGD/colonoscopy, capsule endoscopy. Can be done outpatient    #Dementia   - c/w Home Meds     Patient is medically stable for discharge and to follow up with GI as outpatient.

## 2022-07-11 NOTE — DISCHARGE NOTE PROVIDER - CARE PROVIDERS DIRECT ADDRESSES
[email or web address]

## 2022-07-11 NOTE — PROGRESS NOTE ADULT - SUBJECTIVE AND OBJECTIVE BOX
TONY COBURN  81y  Female      Patient is a 81y old  Female who presents with a chief complaint of anemia .      INTERVAL HPI/OVERNIGHT EVENTS:      ******************************* REVIEW OF SYSTEMS:**********************************************    All other review of systems negative    *********************** VITALS ******************************************    T(F): 97.1 (07-09-22 @ 05:22)  HR: 77 (07-09-22 @ 05:22) (77 - 77)  BP: 131/62 (07-09-22 @ 05:22) (131/62 - 165/79)  RR: 18 (07-09-22 @ 05:22) (18 - 18)  SpO2: 93% (07-09-22 @ 05:22) (93% - 95%)    07-08-22 @ 07:01  -  07-09-22 @ 07:00  --------------------------------------------------------  IN: 0 mL / OUT: 500 mL / NET: -500 mL            07-08-22 @ 07:01  -  07-09-22 @ 07:00  --------------------------------------------------------  IN: 0 mL / OUT: 500 mL / NET: -500 mL        ******************************** PHYSICAL EXAM:**************************************************  GENERAL: NAD    PSYCH: no agitation, baseline mentation  HEENT:     NERVOUS SYSTEM:  Alert & Oriented X3,   PULMONARY: GISSELL, CTA    CARDIOVASCULAR: S1S2 RRR    GI: Soft, NT, ND; BS present.    EXTREMITIES:  2+ Peripheral Pulses, No clubbing, cyanosis, or edema    LYMPH: No lymphadenopathy noted    SKIN: No rashes or lesions      **************************** LABS *******************************************************                          8.3    4.27  )-----------( 252      ( 09 Jul 2022 08:55 )             29.2     07-09    140  |  103  |  9<L>  ----------------------------<  90  4.8   |  26  |  0.7    Ca    9.0      09 Jul 2022 08:55  Mg     2.1     07-09    TPro  6.4  /  Alb  3.8  /  TBili  0.8  /  DBili  x   /  AST  20  /  ALT  11  /  AlkPhos  111  07-09        PT/INR - ( 07 Jul 2022 16:44 )   PT: 12.40 sec;   INR: 1.08 ratio         PTT - ( 07 Jul 2022 16:44 )  PTT:28.7 sec  Lactate Trend        CAPILLARY BLOOD GLUCOSE              **************************Active Medications *******************************************  No Known Allergies      ferrous    sulfate 325 milliGRAM(s) Oral daily  pantoprazole    Tablet 40 milliGRAM(s) Oral before breakfast      ***************************************************  RADIOLOGY & ADDITIONAL TESTS:    Imaging Personally Reviewed:  [ ] YES  [ ] NO    HEALTH ISSUES - PROBLEM Dx:      
SUBJECTIVE:    Patient is a 81y old Female who presents with a chief complaint of Anemia (10 Jul 2022 12:41)    Currently admitted to medicine with the primary diagnosis of Anemia       Today is hospital day 4d. This morning she is resting comfortably in bed and reports no new issues or overnight events.     PAST MEDICAL & SURGICAL HISTORY  Dementia    No significant past surgical history      SOCIAL HISTORY:  Negative for smoking/alcohol/drug use.     ALLERGIES:  No Known Allergies    MEDICATIONS:  STANDING MEDICATIONS  ferrous    sulfate 325 milliGRAM(s) Oral daily  iron sucrose IVPB 200 milliGRAM(s) IV Intermittent every 24 hours  pantoprazole    Tablet 40 milliGRAM(s) Oral before breakfast    PRN MEDICATIONS    VITALS:   T(F): 97.5  HR: 98  BP: 140/56  RR: 20  SpO2: 97%    LABS:                        8.1    5.21  )-----------( 228      ( 10 Jul 2022 05:33 )             27.8     07-10    141  |  106  |  8<L>  ----------------------------<  103<H>  4.8   |  26  |  0.8    Ca    9.0      10 Jul 2022 05:33  Mg     2.1     07-10    TPro  6.0  /  Alb  3.5  /  TBili  0.7  /  DBili  x   /  AST  18  /  ALT  10  /  AlkPhos  106  07-10                  RADIOLOGY:    PHYSICAL EXAM:  GEN: No acute distress  LUNGS: Clear to auscultation bilaterally   HEART: S1/S2 present. RRR.   ABD: Soft, non-tender, non-distended. Bowel sounds present  EXT: NC/NC/NE/2+PP/GONZALEZ/Skin Intact.   NEURO: AAOX3    Intravenous access:   NG tube:   Foss Catheter:         
SUBJECTIVE:    Patient is a 81y old Female who presents with a chief complaint of anemia (08 Jul 2022 09:59)    Currently admitted to medicine with the primary diagnosis of Anemia       Today is hospital day 3d. This morning she is resting comfortably in bed and reports no new issues or overnight events.     PAST MEDICAL & SURGICAL HISTORY  Dementia    No significant past surgical history      SOCIAL HISTORY:  Negative for smoking/alcohol/drug use.     ALLERGIES:  No Known Allergies    MEDICATIONS:  STANDING MEDICATIONS  ferrous    sulfate 325 milliGRAM(s) Oral daily  pantoprazole    Tablet 40 milliGRAM(s) Oral before breakfast    PRN MEDICATIONS    VITALS:   T(F): 98  HR: 92  BP: 158/112  RR: 18  SpO2: --    LABS:                        8.3    4.27  )-----------( 252      ( 09 Jul 2022 08:55 )             29.2     07-09    140  |  103  |  9<L>  ----------------------------<  90  4.8   |  26  |  0.7    Ca    9.0      09 Jul 2022 08:55  Mg     2.1     07-09    TPro  6.4  /  Alb  3.8  /  TBili  0.8  /  DBili  x   /  AST  20  /  ALT  11  /  AlkPhos  111  07-09                  RADIOLOGY:    PHYSICAL EXAM:  GEN: No acute distress  LUNGS: Clear to auscultation bilaterally   HEART: S1/S2 present. RRR.   ABD: Soft, non-tender, non-distended. Bowel sounds present  EXT: NC/NC/NE/2+PP/GONZALEZ/Skin Intact.   NEURO: AAOX3    Intravenous access:   NG tube:   Foss Catheter:         
SUBJECTIVE:    Patient is a 81y old Female who presents with a chief complaint of low hemoglobin from PCP Dr. Renee' office.  Currently admitted to medicine with the primary diagnosis of Anemia       Today is hospital day 1d. This morning she is resting comfortably in bed and reports no new issues or overnight events.     PAST MEDICAL & SURGICAL HISTORY    SOCIAL HISTORY:  Negative for smoking/alcohol/drug use.     ALLERGIES:  No Known Allergies    MEDICATIONS:  STANDING MEDICATIONS  pantoprazole  Injectable 40 milliGRAM(s) IV Push two times a day    PRN MEDICATIONS    VITALS:   T(F): 98.1  HR: 72  BP: 138/63  RR: 18  SpO2: 96%    LABS:                        7.3    4.69  )-----------( 220      ( 08 Jul 2022 08:49 )             25.5     07-08    141  |  108  |  10  ----------------------------<  91  4.3   |  27  |  0.6<L>    Ca    8.6      08 Jul 2022 08:49  Mg     2.1     07-08    TPro  5.6<L>  /  Alb  3.3<L>  /  TBili  0.6  /  DBili  x   /  AST  15  /  ALT  8   /  AlkPhos  102  07-08    PT/INR - ( 07 Jul 2022 16:44 )   PT: 12.40 sec;   INR: 1.08 ratio         PTT - ( 07 Jul 2022 16:44 )  PTT:28.7 sec              RADIOLOGY:    PHYSICAL EXAM:  GEN: No acute distress  LUNGS: Clear to auscultation bilaterally   HEART: S1/S2 present. RRR.   ABD: Soft, non-tender, non-distended. Bowel sounds present  EXT: NC/NC/NE/2+PP/GONZALEZ/Skin Intact.   NEURO: AAOX3    Intravenous access:   NG tube:   Foss Catheter:         
  TNOY COBURN    81y Female    Patient is a 81y old  Female who presents with a chief complaint of anemia .    OVER NIGHT EVENTS: none     Vital Signs Last 24 Hrs  T(C): 36.7 (10 Jul 2022 05:32), Max: 36.7 (10 Jul 2022 05:32)  T(F): 98 (10 Jul 2022 05:32), Max: 98 (10 Jul 2022 05:32)  HR: 77 (10 Jul 2022 06:27) (77 - 92)  BP: 131/84 (10 Jul 2022 06:27) (129/71 - 158/112)  RR: 18 (10 Jul 2022 05:32) (18 - 19)  SpO2: 94% (10 Jul 2022 06:27) (94% - 94%)    Parameters below as of 10 Jul 2022 06:27  Patient On (Oxygen Delivery Method): room air        07-08-22 @ 07:01  -  07-09-22 @ 07:00  --------------------------------------------------------  IN: 0 mL / OUT: 500 mL / NET: -500 mL    07-08-22 @ 07:01  -  07-09-22 @ 07:00  --------------------------------------------------------  IN: 0 mL / OUT: 500 mL / NET: -500 mL      GENERAL: NAD    PSYCH: no agitation, baseline mentation     PHYSICAL EXAM:  GENERAL: NAD, well-developed  HEAD:  Atraumatic, Normocephalic  EYES: EOMI, PERRLA, conjunctiva and sclera clear  NECK: Supple, No JVD  CHEST/LUNG: Clear to auscultation bilaterally; No wheeze  HEART: Regular rate and rhythm; No murmurs, rubs, or gallops  ABDOMEN: Soft, Nontender, Nondistended; Bowel sounds present  EXTREMITIES:  2+ Peripheral Pulses, No clubbing, cyanosis, or edema  PSYCH: AAOx3  NEUROLOGY: non-focal  SKIN: No rashes or lesions    LABS:                                  8.1    5.21  )-----------( 228      ( 10 Jul 2022 05:33 )             27.8     07-10    141  |  106  |  8<L>  ----------------------------<  103<H>  4.8   |  26  |  0.8    Ca    9.0      10 Jul 2022 05:33  Mg     2.1     07-10    TPro  6.0  /  Alb  3.5  /  TBili  0.7  /  DBili  x   /  AST  18  /  ALT  10  /  AlkPhos  106  07-10               8.3    4.27  )-----------( 252      ( 09 Jul 2022 08:55 )             29.2     07-09    140  |  103  |  9<L>  ----------------------------<  90  4.8   |  26  |  0.7    Ca    9.0      09 Jul 2022 08:55  Mg     2.1     07-09    TPro  6.4  /  Alb  3.8  /  TBili  0.8  /  DBili  x   /  AST  20  /  ALT  11  /  AlkPhos  111  07-09      PT/INR - ( 07 Jul 2022 16:44 )   PT: 12.40 sec;   INR: 1.08 ratio        PTT - ( 07 Jul 2022 16:44 )  PTT:28.7 sec  Lactate Trend    No Known Allergies    ferrous    sulfate 325 milliGRAM(s) Oral daily  pantoprazole    Tablet 40 milliGRAM(s) Oral before breakfast    RADIOLOGY & ADDITIONAL TESTS:    Imaging Personally Reviewed:  [ ] YES     MEDICATIONS  (STANDING):  ferrous    sulfate 325 milliGRAM(s) Oral daily  pantoprazole    Tablet 40 milliGRAM(s) Oral before breakfast

## 2022-07-11 NOTE — PROGRESS NOTE ADULT - ASSESSMENT
82 y/o F w/ Dementia sent to the ED from Dr. Renee's office for evaluation of low hemoglobin    #Microcytic Anemia 2/2 DIMITRIOS   - No acute blood loss while in the hospital  - Due to Severe Iron Def Anemia, Unclear Etiology  - In ED, VS wnl, not tachycardic or hypotensive  -  Hb of 7.3 >> 8.3 today.  (down from 8.2 and 7.5 on 7/7)  -  Iron 13,  Ferritin 5  -  TSH 6.3  B12, Folate >> WNL  -  Started  on Protonix 40 mg BID  -  Trend Hg, Active T&S, 2 Large Bore IV's, Transfuse as needed  -  GI f/u in clinic for s-scope / egd and to complete w/up   -  Advance diet as tolerated.   -  IV Iron 200mg IV qd while in the hospital     #Dementia - c/w Home Meds once med recc completed    #Diet: CLD  #DVT pro: SCD  #GI pro: Protonix   82 y/o F w/ Dementia sent to the ED from Dr. Renee's office for evaluation of low hemoglobin    #Microcytic Anemia 2/2 DIMITRIOS   - No acute blood loss while in the hospital  - Due to Severe Iron Def Anemia, Unclear Etiology  - In ED, VS wnl, not tachycardic or hypotensive  - Hb of 7.3 >> 8.3 today.  (down from 8.2 and 7.5 on 7/7)  - Iron 13,  Ferritin 5  - TSH 6.3  B12, Folate >> WNL  - Started  on Protonix 40 mg BID  - Trend Hg, Active T&S, 2 Large Bore IV's, Transfuse as needed  - GI f/u in clinic for s-scope / egd and to complete w/up   - Advance diet as tolerated.   - Ferrous sulfate 325mg PO QD upon d/c  - Senna QHS upon d/c    #Dementia - c/w Home Meds once med recc completed    #Diet: CLD  #DVT pro: SCD  #GI pro: Protonix

## 2022-07-11 NOTE — PHYSICAL THERAPY INITIAL EVALUATION ADULT - GENERAL OBSERVATIONS, REHAB EVAL
Pt encountered semifowler in bed sleeping in NAD, awoken by PT, pt is confused, did not answer questions that PT asked. Kept stating she sees things in front of her that aren't there. Pt did not oppose OOB to chair. 13:42-15:06. Pt encountered semifowler in bed sleeping in NAD, awoken by PT, pt is confused, did not answer questions that PT asked. Kept stating she sees things in front of her that aren't there. Pt did not oppose OOB to chair.

## 2022-07-11 NOTE — DISCHARGE NOTE PROVIDER - NSDCCPCAREPLAN_GEN_ALL_CORE_FT
PRINCIPAL DISCHARGE DIAGNOSIS  Diagnosis: Anemia  Assessment and Plan of Treatment: Anemia is a condition in which the concentration of red blood cells or hemoglobin in the blood is below normal. Hemoglobin is a substance in red blood cells that carries oxygen to the tissues of the body. Anemia results in not enough oxygen reaching these tissues which can cause symptoms such as weakness, dizziness/lightheadedness, shortness of breath, chest pain, paleness, or nausea. The cause of your anemia may or may not be determined immediately. If your hemoglobin was dangerously low, you may have received a blood transfusion. Usually reactions to transfusions occur immediately but monitor yourself for any fevers, rash, or shortness of breath.  SEEK IMMEDIATE MEDICAL CARE IF YOU HAVE ANY OF THE FOLLOWING SYMPTOMS: extreme weakness/chest pain/shortness of breath, black or bloody stools, vomiting blood, fainting, fever, or any signs of dehydration.   Please follow up with gastroenterology team for further workup.       PRINCIPAL DISCHARGE DIAGNOSIS  Diagnosis: Anemia  Assessment and Plan of Treatment: Anemia is a condition in which the concentration of red blood cells or hemoglobin in the blood is below normal. Hemoglobin is a substance in red blood cells that carries oxygen to the tissues of the body. Anemia results in not enough oxygen reaching these tissues which can cause symptoms such as weakness, dizziness/lightheadedness, shortness of breath, chest pain, paleness, or nausea. The cause of your anemia may or may not be determined immediately. If your hemoglobin was dangerously low, you may have received a blood transfusion. Usually reactions to transfusions occur immediately but monitor yourself for any fevers, rash, or shortness of breath.  SEEK IMMEDIATE MEDICAL CARE IF YOU HAVE ANY OF THE FOLLOWING SYMPTOMS: extreme weakness/chest pain/shortness of breath, black or bloody stools, vomiting blood, fainting, fever, or any signs of dehydration.   Please follow up with gastroenterology team for further workup.  IRON DEFICIENCY ANEMIA   - FOLLOW WITH GI FOR FURTHER W/UP IF DESIRED GIVEN MODERATE DEMENTIA. UP TO FAMILY IF THEY WANT TO WORK THIS UP WITH GI  - NO ACUTE BLEED NOTED   - TAKE IRON AND STOOL SOFTENERS

## 2022-07-11 NOTE — DISCHARGE NOTE PROVIDER - PROVIDER TOKENS
PROVIDER:[TOKEN:[[id number]],FOLLOWUP:[2 weeks],ESTABLISHEDPATIENT:[T]],PROVIDER:[TOKEN:[[id number]],FOLLOWUP:[2 weeks],ESTABLISHEDPATIENT:[T]]

## 2022-07-18 DIAGNOSIS — F03.90 UNSPECIFIED DEMENTIA WITHOUT BEHAVIORAL DISTURBANCE: ICD-10-CM

## 2022-07-18 DIAGNOSIS — D50.9 IRON DEFICIENCY ANEMIA, UNSPECIFIED: ICD-10-CM

## 2022-07-18 DIAGNOSIS — Z79.899 OTHER LONG TERM (CURRENT) DRUG THERAPY: ICD-10-CM

## 2022-10-18 ENCOUNTER — EMERGENCY (EMERGENCY)
Facility: HOSPITAL | Age: 82
LOS: 0 days | Discharge: HOME | End: 2022-10-19
Attending: EMERGENCY MEDICINE | Admitting: EMERGENCY MEDICINE

## 2022-10-18 VITALS
HEART RATE: 79 BPM | RESPIRATION RATE: 18 BRPM | OXYGEN SATURATION: 97 % | TEMPERATURE: 97 F | SYSTOLIC BLOOD PRESSURE: 151 MMHG | DIASTOLIC BLOOD PRESSURE: 72 MMHG

## 2022-10-18 VITALS
OXYGEN SATURATION: 98 % | HEART RATE: 98 BPM | SYSTOLIC BLOOD PRESSURE: 119 MMHG | DIASTOLIC BLOOD PRESSURE: 60 MMHG | RESPIRATION RATE: 18 BRPM | TEMPERATURE: 98 F | HEIGHT: 64 IN

## 2022-10-18 DIAGNOSIS — F03.90 UNSPECIFIED DEMENTIA WITHOUT BEHAVIORAL DISTURBANCE: ICD-10-CM

## 2022-10-18 DIAGNOSIS — R79.89 OTHER SPECIFIED ABNORMAL FINDINGS OF BLOOD CHEMISTRY: ICD-10-CM

## 2022-10-18 DIAGNOSIS — D64.9 ANEMIA, UNSPECIFIED: ICD-10-CM

## 2022-10-18 DIAGNOSIS — E78.5 HYPERLIPIDEMIA, UNSPECIFIED: ICD-10-CM

## 2022-10-18 DIAGNOSIS — E03.9 HYPOTHYROIDISM, UNSPECIFIED: ICD-10-CM

## 2022-10-18 DIAGNOSIS — Z20.822 CONTACT WITH AND (SUSPECTED) EXPOSURE TO COVID-19: ICD-10-CM

## 2022-10-18 LAB
ALBUMIN SERPL ELPH-MCNC: 4.2 G/DL — SIGNIFICANT CHANGE UP (ref 3.5–5.2)
ALP SERPL-CCNC: 81 U/L — SIGNIFICANT CHANGE UP (ref 30–115)
ALT FLD-CCNC: 9 U/L — SIGNIFICANT CHANGE UP (ref 0–41)
ANION GAP SERPL CALC-SCNC: 9 MMOL/L — SIGNIFICANT CHANGE UP (ref 7–14)
ANISOCYTOSIS BLD QL: SIGNIFICANT CHANGE UP
APTT BLD: 29.5 SEC — SIGNIFICANT CHANGE UP (ref 27–39.2)
AST SERPL-CCNC: 15 U/L — SIGNIFICANT CHANGE UP (ref 0–41)
BASOPHILS # BLD AUTO: 0 K/UL — SIGNIFICANT CHANGE UP (ref 0–0.2)
BASOPHILS NFR BLD AUTO: 0 % — SIGNIFICANT CHANGE UP (ref 0–1)
BILIRUB SERPL-MCNC: 0.5 MG/DL — SIGNIFICANT CHANGE UP (ref 0.2–1.2)
BLD GP AB SCN SERPL QL: SIGNIFICANT CHANGE UP
BUN SERPL-MCNC: 17 MG/DL — SIGNIFICANT CHANGE UP (ref 10–20)
CALCIUM SERPL-MCNC: 8.8 MG/DL — SIGNIFICANT CHANGE UP (ref 8.4–10.5)
CHLORIDE SERPL-SCNC: 105 MMOL/L — SIGNIFICANT CHANGE UP (ref 98–110)
CO2 SERPL-SCNC: 26 MMOL/L — SIGNIFICANT CHANGE UP (ref 17–32)
CREAT SERPL-MCNC: 0.7 MG/DL — SIGNIFICANT CHANGE UP (ref 0.7–1.5)
EGFR: 86 ML/MIN/1.73M2 — SIGNIFICANT CHANGE UP
EOSINOPHIL # BLD AUTO: 0.11 K/UL — SIGNIFICANT CHANGE UP (ref 0–0.7)
EOSINOPHIL NFR BLD AUTO: 1.8 % — SIGNIFICANT CHANGE UP (ref 0–8)
GIANT PLATELETS BLD QL SMEAR: PRESENT — SIGNIFICANT CHANGE UP
GLUCOSE SERPL-MCNC: 109 MG/DL — HIGH (ref 70–99)
HCT VFR BLD CALC: 21.9 % — LOW (ref 37–47)
HGB BLD-MCNC: 6 G/DL — CRITICAL LOW (ref 12–16)
HYPOCHROMIA BLD QL: SLIGHT — SIGNIFICANT CHANGE UP
INR BLD: 1.03 RATIO — SIGNIFICANT CHANGE UP (ref 0.65–1.3)
LYMPHOCYTES # BLD AUTO: 1.73 K/UL — SIGNIFICANT CHANGE UP (ref 1.2–3.4)
LYMPHOCYTES # BLD AUTO: 28.3 % — SIGNIFICANT CHANGE UP (ref 20.5–51.1)
MANUAL SMEAR VERIFICATION: SIGNIFICANT CHANGE UP
MCHC RBC-ENTMCNC: 20.3 PG — LOW (ref 27–31)
MCHC RBC-ENTMCNC: 27.4 G/DL — LOW (ref 32–37)
MCV RBC AUTO: 74.2 FL — LOW (ref 81–99)
MICROCYTES BLD QL: SIGNIFICANT CHANGE UP
MONOCYTES # BLD AUTO: 0.32 K/UL — SIGNIFICANT CHANGE UP (ref 0.1–0.6)
MONOCYTES NFR BLD AUTO: 5.3 % — SIGNIFICANT CHANGE UP (ref 1.7–9.3)
NEUTROPHILS # BLD AUTO: 3.95 K/UL — SIGNIFICANT CHANGE UP (ref 1.4–6.5)
NEUTROPHILS NFR BLD AUTO: 64.6 % — SIGNIFICANT CHANGE UP (ref 42.2–75.2)
OVALOCYTES BLD QL SMEAR: SLIGHT — SIGNIFICANT CHANGE UP
PLAT MORPH BLD: NORMAL — SIGNIFICANT CHANGE UP
PLATELET # BLD AUTO: 318 K/UL — SIGNIFICANT CHANGE UP (ref 130–400)
POIKILOCYTOSIS BLD QL AUTO: SLIGHT — SIGNIFICANT CHANGE UP
POLYCHROMASIA BLD QL SMEAR: SIGNIFICANT CHANGE UP
POTASSIUM SERPL-MCNC: 4.5 MMOL/L — SIGNIFICANT CHANGE UP (ref 3.5–5)
POTASSIUM SERPL-SCNC: 4.5 MMOL/L — SIGNIFICANT CHANGE UP (ref 3.5–5)
PROT SERPL-MCNC: 6.7 G/DL — SIGNIFICANT CHANGE UP (ref 6–8)
PROTHROM AB SERPL-ACNC: 11.8 SEC — SIGNIFICANT CHANGE UP (ref 9.95–12.87)
RBC # BLD: 2.95 M/UL — LOW (ref 4.2–5.4)
RBC # FLD: 16.9 % — HIGH (ref 11.5–14.5)
RBC BLD AUTO: ABNORMAL
SARS-COV-2 RNA SPEC QL NAA+PROBE: SIGNIFICANT CHANGE UP
SMUDGE CELLS # BLD: PRESENT — SIGNIFICANT CHANGE UP
SODIUM SERPL-SCNC: 140 MMOL/L — SIGNIFICANT CHANGE UP (ref 135–146)
WBC # BLD: 6.11 K/UL — SIGNIFICANT CHANGE UP (ref 4.8–10.8)
WBC # FLD AUTO: 6.11 K/UL — SIGNIFICANT CHANGE UP (ref 4.8–10.8)

## 2022-10-18 PROCEDURE — 99220: CPT | Mod: FS

## 2022-10-18 NOTE — ED CDU PROVIDER INITIAL DAY NOTE - MEDICAL DECISION MAKING DETAILS
81-year-old female with h/o dementia, hypothyroidism, HLD, anemia requiring transfusion, sent to ER from living facility for blood transfusion after Hgb found to be 5.8 on recent lab work.  Patient unable to provide any further history.  Per transfer notes, patient acting at baseline.  PE as above, no blood on rectal exam.  hgb 6.0, pt placed in EDOU for 2unit PRBC transfusion.

## 2022-10-18 NOTE — ED PROVIDER NOTE - ATTENDING APP SHARED VISIT CONTRIBUTION OF CARE
81-year-old female with h/o dementia, hypothyroidism, HLD, anemia requiring transfusion, sent to ER from living facility for blood transfusion after Hgb found to be 5.8 on recent lab work.  Patient unable to provide any further history.  Per transfer notes, patient acting at baseline.  PE as above, no blood on rectal exam.  -Check labs, transfuse as necessary.

## 2022-10-18 NOTE — ED ADULT NURSE REASSESSMENT NOTE - NS ED NURSE REASSESS COMMENT FT1
1unit of PRBC infused. no s/s of transfusion reaction, patient tolerated transfusion well  VS wnl. awaiting on lab for 2nd unit.

## 2022-10-18 NOTE — ED ADULT TRIAGE NOTE - CHIEF COMPLAINT QUOTE
Pt bibems from nursing home in NAD awake and alert, confused baseline hx of dementia for low hemoglobin of 5.8 from blood work 4 days ago. Color good, denies any symptoms. Blood pressure 119/60 and HR 90's

## 2022-10-18 NOTE — ED PROVIDER NOTE - PHYSICAL EXAMINATION
CONSTITUTIONAL: Elderly F ; in no apparent distress.   EYES: PERRL; EOM intact.   ENT: normal nose; no rhinorrhea; normal pharynx with no tonsillar hypertrophy.   NECK: Supple; non-tender; no cervical lymphadenopathy.   CARDIOVASCULAR: Normal S1, S2; no murmurs, rubs, or gallops.   RESPIRATORY: Normal chest excursion with respiration; breath sounds clear and equal bilaterally; no wheezes, rhonchi, or rales.  GI/: Normal bowel sounds; non-distended; non-tender; no palpable organomegaly.   REctal exam chaperoned by WILD Morejon: no external abnl. No blood/stool/or melena noted  MS: No evidence of trauma or deformity. Normal ROM in all four extremities; non-tender to palpation; distal pulses are normal.   SKIN: Normal for age and race; warm; dry; good turgor; no apparent lesions or exudate.   NEURO/PSYCH: Pt awake and alert. Pt able to follow some commands. Moving all extremities. No focal deficits

## 2022-10-18 NOTE — ED ADULT NURSE NOTE - NSIMPLEMENTINTERV_GEN_ALL_ED
Implemented All Fall Risk Interventions:  [place] to call system. Call bell, personal items and telephone within reach. Instruct patient to call for assistance. Room bathroom lighting operational. Non-slip footwear when patient is off stretcher. Physically safe environment: no spills, clutter or unnecessary equipment. Stretcher in lowest position, wheels locked, appropriate side rails in place. Provide visual cue, wrist band, yellow gown, etc. Monitor gait and stability. Monitor for mental status changes and reorient to person, place, and time. Review medications for side effects contributing to fall risk. Reinforce activity limits and safety measures with patient and family.

## 2022-10-18 NOTE — ED PROVIDER NOTE - CLINICAL SUMMARY MEDICAL DECISION MAKING FREE TEXT BOX
Labs reviewed: Hgb 6.0, CMP unremarkable.  to transfuse 2 units PRBCs, patient placed in EDOU for blood transfusion.

## 2022-10-18 NOTE — ED PROVIDER NOTE - OBJECTIVE STATEMENT
81-year-old female with past medical history of dementia from the [facility], hypothyroid, hyperlipidemia, recently admitted 07/22 for anemia req blood transfusion sent in for low hemoglobin.  As per documentation patient had recent hemoglobin of 5.8 patient is not on any blood thinners.  States has been acting at baseline.  Unable to obtain history from patient due to dementia.

## 2022-10-19 PROCEDURE — 99217: CPT

## 2022-10-19 NOTE — ED CDU PROVIDER DISPOSITION NOTE - CARE PROVIDERS DIRECT ADDRESSES
,[email or web address]

## 2022-10-19 NOTE — ED CDU PROVIDER DISPOSITION NOTE - CLINICAL COURSE
vEU for blood transfusion - remained HD stable, transfused w/o incident, return precautons discussed, op Hem/Onc & GI f/u

## 2022-10-19 NOTE — ED CDU PROVIDER DISPOSITION NOTE - PATIENT PORTAL LINK FT
You can access the FollowMyHealth Patient Portal offered by [facility] by registering at the following website: [email or web address]. By joining [facility]’s FollowMyHealth portal, you will also be able to view your health information using other applications (apps) compatible with our system.

## 2022-10-19 NOTE — ED CDU PROVIDER DISPOSITION NOTE - CARE PROVIDER_API CALL
Leroy Bonner)  Hematology; Internal Medicine; Medical Oncology  [address] [place] [address]  Phone: [phone]  Fax: [phone]  Follow Up Time:     Daniel Schmidt)  Gastroenterology  [address] [place] [address]  Phone: [phone]  Fax: [phone]  Follow Up Time:

## 2022-12-13 PROBLEM — Z00.00 ENCOUNTER FOR PREVENTIVE HEALTH EXAMINATION: Status: ACTIVE | Noted: 2022-12-13

## 2023-01-18 ENCOUNTER — APPOINTMENT (OUTPATIENT)
Dept: HEMATOLOGY ONCOLOGY | Facility: CLINIC | Age: 83
End: 2023-01-18

## 2023-04-15 NOTE — DISCHARGE NOTE PROVIDER - CARE PROVIDER_API CALL
1923 Dayton Children's HospitalSerge MD    [facility] Visit    3 months ago Rotator cuff arthropathy of right shoulder    Gilma Villa, Sherren Dykes, MD    Office Visit    4 months ago Subacromial bursitis of right shoulder joint    Gilma Villa, [facility], Golden Bates MD    Office Visit    7 months ago     [facility] in [place], [place]    Office Visit    8 months ago     [facility] in [address] Visit          Future Appointments         Provider Department Appt Notes    In 5 days Varghese Agosto MD [address] [facility],[address], [address] [facility], [facility] 1923 Dayton Children's HospitalSerge MD    [facility] Visit    3 months ago Rotator cuff arthropathy of right shoulder    Yenifer Enamorado, [address], Devante Stone MD    Office Visit    4 months ago Subacromial bursitis of right shoulder joint    Yogi Dash, Keara Cole MD    Office Visit    7 months ago     [facility] in [place], [place]    Office Visit    8 months ago     [facility] in [place], [place]    Office Visit          Future Appointments         Provider Department Appt Notes    In 5 days MD Yenifer Millard, [address] [facility] [address], [facility] back issue per x-ray results.  -   4/11/23 - LVM for Pt confirming Pt needs referral for this appt - [facility] Hayley Pro)  Gastroenterology  [address] [place] [address]  Phone: [phone]  Fax: [phone]  Established Patient  Follow Up Time: 2 weeks    Marlon Renee)  Family Medicine  [address] [place] [address]  Phone: [phone]  Fax: [phone]  Established Patient  Follow Up Time: 2 weeks

## 2023-06-03 NOTE — ED CDU PROVIDER DISPOSITION NOTE - NS ED ATTENDING STATEMENT MOD
This was a shared visit with the CRISTINA. I reviewed and verified the documentation and independently performed the documented: Attending with 03-Jun-2023
